# Patient Record
Sex: FEMALE | Race: WHITE | NOT HISPANIC OR LATINO | Employment: FULL TIME | URBAN - METROPOLITAN AREA
[De-identification: names, ages, dates, MRNs, and addresses within clinical notes are randomized per-mention and may not be internally consistent; named-entity substitution may affect disease eponyms.]

---

## 2018-01-15 ENCOUNTER — IMMUNIZATION (OUTPATIENT)
Dept: INTERNAL MEDICINE | Facility: CLINIC | Age: 38
End: 2018-01-15
Payer: COMMERCIAL

## 2018-01-15 PROCEDURE — 90686 IIV4 VACC NO PRSV 0.5 ML IM: CPT | Mod: S$GLB,,, | Performed by: FAMILY MEDICINE

## 2018-01-15 PROCEDURE — 90471 IMMUNIZATION ADMIN: CPT | Mod: S$GLB,,, | Performed by: FAMILY MEDICINE

## 2023-01-16 ENCOUNTER — OFFICE VISIT (OUTPATIENT)
Dept: URGENT CARE | Facility: CLINIC | Age: 43
End: 2023-01-16
Payer: OTHER GOVERNMENT

## 2023-01-16 VITALS
BODY MASS INDEX: 21.09 KG/M2 | OXYGEN SATURATION: 100 % | SYSTOLIC BLOOD PRESSURE: 133 MMHG | HEIGHT: 63 IN | DIASTOLIC BLOOD PRESSURE: 99 MMHG | TEMPERATURE: 98 F | HEART RATE: 92 BPM | WEIGHT: 119 LBS | RESPIRATION RATE: 12 BRPM

## 2023-01-16 DIAGNOSIS — R35.0 FREQUENCY OF URINATION: ICD-10-CM

## 2023-01-16 DIAGNOSIS — N30.01 ACUTE CYSTITIS WITH HEMATURIA: Primary | ICD-10-CM

## 2023-01-16 LAB
BILIRUB UR QL STRIP: POSITIVE
GLUCOSE UR QL STRIP: NEGATIVE
KETONES UR QL STRIP: NEGATIVE
LEUKOCYTE ESTERASE UR QL STRIP: NEGATIVE
PH, POC UA: 7.5
POC BLOOD, URINE: POSITIVE
POC NITRATES, URINE: POSITIVE
PROT UR QL STRIP: POSITIVE
SP GR UR STRIP: 1.01 (ref 1–1.03)
UROBILINOGEN UR STRIP-ACNC: ABNORMAL (ref 0.1–1.1)

## 2023-01-16 PROCEDURE — 99214 PR OFFICE/OUTPT VISIT, EST, LEVL IV, 30-39 MIN: ICD-10-PCS | Mod: S$GLB,,, | Performed by: NURSE PRACTITIONER

## 2023-01-16 PROCEDURE — 81003 URINALYSIS AUTO W/O SCOPE: CPT | Mod: QW,S$GLB,, | Performed by: NURSE PRACTITIONER

## 2023-01-16 PROCEDURE — 81003 POCT URINALYSIS, DIPSTICK, AUTOMATED, W/O SCOPE: ICD-10-PCS | Mod: QW,S$GLB,, | Performed by: NURSE PRACTITIONER

## 2023-01-16 PROCEDURE — 99214 OFFICE O/P EST MOD 30 MIN: CPT | Mod: S$GLB,,, | Performed by: NURSE PRACTITIONER

## 2023-01-16 RX ORDER — NITROFURANTOIN 25; 75 MG/1; MG/1
100 CAPSULE ORAL 2 TIMES DAILY
Qty: 10 CAPSULE | Refills: 0 | Status: SHIPPED | OUTPATIENT
Start: 2023-01-16 | End: 2023-01-21

## 2023-01-16 NOTE — PROGRESS NOTES
"Subjective:       Patient ID: Noreen Martniez is a 42 y.o. female.    Vitals:  height is 5' 3" (1.6 m) and weight is 54 kg (119 lb). Her temperature is 98.2 °F (36.8 °C). Her blood pressure is 133/99 (abnormal) and her pulse is 92. Her respiration is 12 and oxygen saturation is 100%.     Chief Complaint: Dysuria        Pateint is a 42 year old female who presents to urgent care for evaluation of dysuria. Denies associated symptoms of abdominal pain, back pain, flank pain, fever or chills.     Dysuria   This is a new problem. The current episode started yesterday. The problem occurs every urination. The problem has been gradually worsening. The quality of the pain is described as burning. The pain is at a severity of 4/10. The pain is mild. There has been no fever. She is Sexually active. There is No history of pyelonephritis. Associated symptoms include frequency, hematuria, hesitancy, urgency and withholding. Pertinent negatives include no chills, discharge, flank pain, nausea, possible pregnancy, vomiting, bubble bath use or constipation. Treatments tried: AZO. The treatment provided mild relief. There is no history of diabetes insipidus, diabetes mellitus, hypertension, kidney stones, recurrent UTIs, a single kidney or STD.     Constitution: Negative. Negative for chills and fever.   HENT: Negative.     Neck: neck negative.   Cardiovascular: Negative.    Eyes: Negative.    Respiratory: Negative.     Gastrointestinal: Negative.  Negative for nausea, vomiting and constipation.   Genitourinary:  Positive for dysuria, frequency, urgency and hematuria. Negative for flank pain.   Musculoskeletal: Negative.    Skin: Negative.      Objective:      Physical Exam   Constitutional: She is oriented to person, place, and time. She appears well-developed. She is cooperative.  Non-toxic appearance. She does not appear ill. No distress.   HENT:   Head: Normocephalic and atraumatic.   Ears:   Right Ear: Hearing, tympanic " membrane, external ear and ear canal normal.   Left Ear: Hearing, tympanic membrane, external ear and ear canal normal.   Nose: Nose normal. No mucosal edema, rhinorrhea or nasal deformity. No epistaxis. Right sinus exhibits no maxillary sinus tenderness and no frontal sinus tenderness. Left sinus exhibits no maxillary sinus tenderness and no frontal sinus tenderness.   Mouth/Throat: Uvula is midline, oropharynx is clear and moist and mucous membranes are normal. No trismus in the jaw. Normal dentition. No uvula swelling. No posterior oropharyngeal erythema.   Eyes: Conjunctivae and lids are normal. Right eye exhibits no discharge. Left eye exhibits no discharge. No scleral icterus.   Neck: Trachea normal and phonation normal. Neck supple.   Cardiovascular: Normal rate, regular rhythm, normal heart sounds and normal pulses.   Pulmonary/Chest: Effort normal and breath sounds normal. No respiratory distress.   Abdominal: Normal appearance and bowel sounds are normal. She exhibits no distension and no mass. Soft. There is no abdominal tenderness. There is no left CVA tenderness and no right CVA tenderness.   Musculoskeletal: Normal range of motion.         General: No deformity. Normal range of motion.   Neurological: She is alert and oriented to person, place, and time. She exhibits normal muscle tone. Coordination normal.   Skin: Skin is warm, dry, intact, not diaphoretic and not pale.   Psychiatric: Her speech is normal and behavior is normal. Judgment and thought content normal.   Nursing note and vitals reviewed.      Assessment:       1. Acute cystitis with hematuria    2. Frequency of urination            Plan:         Acute cystitis with hematuria  -     nitrofurantoin, macrocrystal-monohydrate, (MACROBID) 100 MG capsule; Take 1 capsule (100 mg total) by mouth 2 (two) times daily. for 5 days  Dispense: 10 capsule; Refill: 0    Frequency of urination  -     POCT Urinalysis, Dipstick, Automated, W/O Scope               Results for orders placed or performed in visit on 01/16/23   POCT Urinalysis, Dipstick, Automated, W/O Scope   Result Value Ref Range    POC Blood, Urine Positive (A) Negative    POC Bilirubin, Urine Positive (A) Negative    POC Urobilinogen, Urine Pos 0.1 - 1.1    POC Ketones, Urine Negative Negative    POC Protein, Urine Positive (A) Negative    POC Nitrates, Urine Positive (A) Negative    POC Glucose, Urine Negative Negative    pH, UA 7.5     POC Specific Gravity, Urine 1.010 1.003 - 1.029    POC Leukocytes, Urine Negative Negative             PLEASE READ YOUR DISCHARGE INSTRUCTIONS ENTIRELY AS IT CONTAINS IMPORTANT INFORMATION.      Please drink plenty of fluids.    Please get plenty of rest.    Please return here or go to the Emergency Department for any concerns or worsening of condition.    Please take an over the counter antihistamine medication (allegra/Claritin/Zyrtec) of your choice as directed.    Try an over the counter decongestant like Mucinex D or Sudafed. You buy this behind the pharmacy counter    If you do have Hypertension or palpitations, it is safe to take Coricidin HBP for relief of sinus symptoms.    If not allergic, please take over the counter Tylenol (Acetaminophen) and/or Motrin (Ibuprofen) as directed for control of pain and/or fever.  Please follow up with your primary care doctor or specialist as needed.    Sore throat recommendations: Warm fluids, warm salt water gargles, throat lozenges, tea, honey, soup, rest, hydration.    Use over the counter flonase: one spray each nostril twice daily OR two sprays each nostril once daily.     Sinus rinses DO NOT USE TAP WATER, if you must, water must be a rolling boil for 1 minute, let it cool, then use.  May use distilled water, or over the counter nasal saline rinses.  Vics vapor rub in shower to help open nasal passages.  May use nasal gel to keep passages moisturized.  May use Nasal saline sprays during the day for added relief of  congestion.   For those who go to the gym, please do not use the sauna or steam room now to clear sinuses.    If you  smoke, please stop smoking.      Please return or see your primary care doctor if you develop new or worsening symptoms.     Please arrange follow up with your primary medical clinic as soon as possible. You must understand that you've received an Urgent Care treatment only and that you may be released before all of your medical problems are known or treated. You, the patient, will arrange for follow up as instructed. If your symptoms worsen or fail to improve you should go to the Emergency Room.

## 2023-04-26 ENCOUNTER — PATIENT MESSAGE (OUTPATIENT)
Dept: ADMINISTRATIVE | Facility: OTHER | Age: 43
End: 2023-04-26
Payer: OTHER GOVERNMENT

## 2023-08-05 ENCOUNTER — OFFICE VISIT (OUTPATIENT)
Dept: URGENT CARE | Facility: CLINIC | Age: 43
End: 2023-08-05
Payer: OTHER GOVERNMENT

## 2023-08-05 VITALS
OXYGEN SATURATION: 98 % | DIASTOLIC BLOOD PRESSURE: 97 MMHG | RESPIRATION RATE: 18 BRPM | WEIGHT: 115 LBS | BODY MASS INDEX: 20.38 KG/M2 | SYSTOLIC BLOOD PRESSURE: 137 MMHG | HEIGHT: 63 IN | TEMPERATURE: 99 F | HEART RATE: 109 BPM

## 2023-08-05 DIAGNOSIS — N89.8 VAGINAL DISCHARGE: ICD-10-CM

## 2023-08-05 DIAGNOSIS — R30.0 DYSURIA: ICD-10-CM

## 2023-08-05 DIAGNOSIS — N30.01 ACUTE CYSTITIS WITH HEMATURIA: Primary | ICD-10-CM

## 2023-08-05 LAB
BILIRUB UR QL STRIP: NEGATIVE
GLUCOSE UR QL STRIP: NEGATIVE
KETONES UR QL STRIP: POSITIVE
LEUKOCYTE ESTERASE UR QL STRIP: POSITIVE
PH, POC UA: 5
POC BLOOD, URINE: POSITIVE
POC NITRATES, URINE: NEGATIVE
PROT UR QL STRIP: POSITIVE
SP GR UR STRIP: 1.02 (ref 1–1.03)
UROBILINOGEN UR STRIP-ACNC: NORMAL (ref 0.1–1.1)

## 2023-08-05 PROCEDURE — 81003 URINALYSIS AUTO W/O SCOPE: CPT | Mod: QW,S$GLB,,

## 2023-08-05 PROCEDURE — 99213 OFFICE O/P EST LOW 20 MIN: CPT | Mod: S$GLB,,,

## 2023-08-05 PROCEDURE — 99213 PR OFFICE/OUTPT VISIT, EST, LEVL III, 20-29 MIN: ICD-10-PCS | Mod: S$GLB,,,

## 2023-08-05 PROCEDURE — 81514 NFCT DS BV&VAGINITIS DNA ALG: CPT

## 2023-08-05 PROCEDURE — 81003 POCT URINALYSIS, DIPSTICK, AUTOMATED, W/O SCOPE: ICD-10-PCS | Mod: QW,S$GLB,,

## 2023-08-05 RX ORDER — NITROFURANTOIN 25; 75 MG/1; MG/1
100 CAPSULE ORAL 2 TIMES DAILY
Qty: 10 CAPSULE | Refills: 0 | Status: SHIPPED | OUTPATIENT
Start: 2023-08-05 | End: 2023-08-10

## 2023-08-05 NOTE — PROGRESS NOTES
"Subjective:      Patient ID: Noreen Martinez is a 43 y.o. female.    Vitals:  height is 5' 3" (1.6 m) and weight is 52.2 kg (115 lb). Her tympanic temperature is 98.7 °F (37.1 °C). Her blood pressure is 137/97 (abnormal) and her pulse is 109. Her respiration is 18 and oxygen saturation is 98%.     Chief Complaint: Dysuria    Noreen Martinez is a 43 y.o. female who presents for dysuria which onset this morning. Associated sxs include increased urinary frequency and urgency and pelvic pain. She also reports vaginal discharge. Patient denies any fever, chills, abd pain, flank pain, hematuria, and decreased urination. Patient does have a hx of UTIs. No hx of pyelonephritis. Prior Tx includes AZO.     Dysuria   This is a new problem. The current episode started today. The problem has been gradually worsening. The quality of the pain is described as burning and aching. The pain is at a severity of 5/10. There is No history of pyelonephritis. Associated symptoms include hesitancy and urgency. Pertinent negatives include no behavior changes, chills, discharge, flank pain, frequency, hematuria, nausea, possible pregnancy, sweats, vomiting, weight loss, bubble bath use, constipation, rash or withholding. Treatments tried: AZO. The treatment provided mild relief.       Constitution: Negative for chills, fatigue and fever.   Gastrointestinal:  Positive for abdominal pain. Negative for nausea, vomiting, constipation and diarrhea.   Genitourinary:  Positive for dysuria, urgency and vaginal discharge. Negative for frequency, urine decreased, flank pain, bladder incontinence, hematuria, history of kidney stones and genital sore.   Skin:  Negative for rash.   Neurological:  Negative for numbness and tingling.      Objective:     Physical Exam   Constitutional: She is oriented to person, place, and time. She appears well-developed.   HENT:   Head: Normocephalic and atraumatic.   Ears:   Right Ear: External ear normal.   Left Ear: " External ear normal.   Nose: Nose normal.   Mouth/Throat: Mucous membranes are normal.   Eyes: Conjunctivae and lids are normal.   Neck: Trachea normal. Neck supple.   Cardiovascular: Normal rate, regular rhythm and normal heart sounds.   Pulmonary/Chest: Effort normal and breath sounds normal. No respiratory distress.   Abdominal: Normal appearance and bowel sounds are normal. She exhibits no distension and no mass. Soft. There is no abdominal tenderness. There is no rebound, no guarding, no left CVA tenderness and no right CVA tenderness.   Musculoskeletal: Normal range of motion.         General: Normal range of motion.   Neurological: She is alert and oriented to person, place, and time. She has normal strength.   Skin: Skin is warm, dry, intact, not diaphoretic and not pale.   Psychiatric: Her speech is normal and behavior is normal. Judgment and thought content normal.   Nursing note and vitals reviewed.      Assessment:     1. Acute cystitis with hematuria    2. Dysuria    3. Vaginal discharge        Results for orders placed or performed in visit on 08/05/23   POCT Urinalysis, Dipstick, Automated, W/O Scope   Result Value Ref Range    POC Blood, Urine Positive (A) Negative    POC Bilirubin, Urine Negative Negative    POC Urobilinogen, Urine Normal 0.1 - 1.1    POC Ketones, Urine Positive (A) Negative    POC Protein, Urine Positive (A) Negative    POC Nitrates, Urine Negative Negative    POC Glucose, Urine Negative Negative    pH, UA 5.0     POC Specific Gravity, Urine 1.025 1.003 - 1.029    POC Leukocytes, Urine Positive (A) Negative       Plan:       Acute cystitis with hematuria  -     nitrofurantoin, macrocrystal-monohydrate, (MACROBID) 100 MG capsule; Take 1 capsule (100 mg total) by mouth 2 (two) times daily. for 5 days  Dispense: 10 capsule; Refill: 0    Dysuria  -     POCT Urinalysis, Dipstick, Automated, W/O Scope    Vaginal discharge  -     VAGINOSIS SCREEN BY DNA PROBE    Afebrile.  UA with  hematuria and pyuria. Also, protein and ketones. She is taking AZO.  Reviewed abnormal urinalysis with patient who verbalized understanding.  Vaginosis screen ordered due to vaginal discharge. Will notify patient with results in 3-5 days and treat as necessary.  Meds: macrobid sent to preferred pharmacy.  Increase fluid intake.  Discussed proper hygiene practices such as wiping front to back, take showers not baths, no scented soaps, wear breathable cotton underwear, and urinate after sexual intercourse.  RTC for any worsening symptoms.  Discussed ER precautions including fever, worsening flank pain, vomiting, etc.

## 2023-08-05 NOTE — PATIENT INSTRUCTIONS
PLEASE READ YOUR DISCHARGE INSTRUCTIONS ENTIRELY AS IT CONTAINS IMPORTANT INFORMATION.      Take the antibiotics to completion.     Drink plenty of fluids, wipe front to back, take showers not baths, no scented soaps, wear breathable cotton underwear, urinate after sexual intercourse.     Take the pyridium three times a day with meals. It will turn your urine orange. You do not need to take the whole prescription you can stop this once the pain is better and finish out the antibiotics    Please go to the ER for worsening symptoms including fever, worsening flank pain, vomiting, etc.       Please return or see your primary care doctor if you develop new or worsening symptoms.     Please arrange follow up with your primary medical clinic as soon as possible. You must understand that you've received an Urgent Care treatment only and that you may be released before all of your medical problems are known or treated. You, the patient, will arrange for follow up as instructed. If your symptoms worsen or fail to improve you should go to the Emergency Room.  WE CANNOT RULE OUT ALL POSSIBLE CAUSES OF YOUR SYMPTOMS IN THE URGENT CARE SETTING PLEASE GO TO THE ER IF YOU FEELS YOUR CONDITION IS WORSENING OR YOU WOULD LIKE EMERGENT EVALUATION.

## 2023-08-07 ENCOUNTER — TELEPHONE (OUTPATIENT)
Dept: URGENT CARE | Facility: CLINIC | Age: 43
End: 2023-08-07
Payer: OTHER GOVERNMENT

## 2023-08-07 LAB
BACTERIAL VAGINOSIS DNA: NEGATIVE
CANDIDA GLABRATA DNA: NEGATIVE
CANDIDA KRUSEI DNA: NEGATIVE
CANDIDA RRNA VAG QL PROBE: NEGATIVE
T VAGINALIS RRNA GENITAL QL PROBE: NEGATIVE

## 2023-08-08 NOTE — TELEPHONE ENCOUNTER
Called to discuss negative vaginosis screening and no answer. Patient noted to have viewed results on [employer]. If pt has any additional questions may reach out to clinic.

## 2024-01-03 ENCOUNTER — OFFICE VISIT (OUTPATIENT)
Dept: URGENT CARE | Facility: CLINIC | Age: 44
End: 2024-01-03
Payer: OTHER GOVERNMENT

## 2024-01-03 VITALS
WEIGHT: 114 LBS | OXYGEN SATURATION: 100 % | HEIGHT: 63 IN | TEMPERATURE: 99 F | BODY MASS INDEX: 20.2 KG/M2 | DIASTOLIC BLOOD PRESSURE: 96 MMHG | RESPIRATION RATE: 18 BRPM | SYSTOLIC BLOOD PRESSURE: 144 MMHG | HEART RATE: 97 BPM

## 2024-01-03 DIAGNOSIS — B30.9 VIRAL CONJUNCTIVITIS: Primary | ICD-10-CM

## 2024-01-03 PROCEDURE — 99213 OFFICE O/P EST LOW 20 MIN: CPT | Mod: S$GLB,,, | Performed by: PHYSICIAN ASSISTANT

## 2024-01-03 NOTE — PATIENT INSTRUCTIONS
Over the counter PATADAY eye drops as directed. OTC Systane or OPTIVE brand lubricating drops throughout the day as needed - avoid using within 45 minutes of the PATADAY. Refrigerate the drops to help with irritation. Warm compresses as needed especially in the morning to help with crusting.    Viral conjunctivitis is still very contagious. Wash hands often.    You do not need antibiotic drops unless you develop signs of bacterial pinkeye - thick green/yellow drainage that is continuous throughout the day, worsening redness.    You need re-evaluation with ophthalmology if vision changes or worsening symptoms.     Do not wear your contact lens ( if you use them) for at least 5 days after you stop having symptoms and are rechecked by your doctor. Throw away the contacts, contact solution and carrying case you were using and start with new material. You may also need to throw away any eye makeup/mascara that you used while your eye was irritated.  If you develop increase eye symptoms or change in your vision seek medical care immediately either with your ophthalomologist or the ER or return here.

## 2024-01-03 NOTE — PROGRESS NOTES
"Subjective:      Patient ID: Noreen Martinez is a 43 y.o. female.    Vitals:  height is 5' 3" (1.6 m) and weight is 51.7 kg (114 lb). Her oral temperature is 98.5 °F (36.9 °C). Her blood pressure is 144/96 (abnormal) and her pulse is 97. Her respiration is 18 and oxygen saturation is 100%.     Chief Complaint: Eye Problem    Bilateral eye irritation onset yesterday with crusting to both eyes this morning. Has clear drainage since then. No redness, vision changes, trauma, HA, N/V or hx of eye dz. Does not wear contacts. Son had pink eye last week     Eye Problem   Both eyes are affected. This is a new problem. The current episode started yesterday. The problem occurs constantly. The problem has been unchanged. There was no injury mechanism. The pain is at a severity of 4/10. The pain is mild. There is Known exposure to pink eye. She Does not wear contacts. Associated symptoms include an eye discharge and itching. Pertinent negatives include no blurred vision, double vision, eye redness, fever, foreign body sensation, nausea, photophobia, recent URI or vomiting. She has tried eye drops for the symptoms. The treatment provided mild relief.       Constitution: Negative for sweating, fatigue, fever and unexpected weight change.   Eyes:  Positive for eye discharge, eye itching and eye pain (irritation). Negative for eye trauma, foreign body in eye, eye redness, photophobia, vision loss, double vision, blurred vision and eyelid swelling.   Respiratory:  Negative for cough, sputum production and shortness of breath.    Gastrointestinal:  Negative for nausea and vomiting.   Neurological:  Negative for headaches, numbness and tingling.      Objective:     Physical Exam   Constitutional: She is oriented to person, place, and time. She appears well-developed. She is cooperative.  Non-toxic appearance. She does not appear ill. No distress.   HENT:   Head: Normocephalic and atraumatic.   Ears:   Right Ear: Hearing, tympanic " membrane, external ear and ear canal normal.   Left Ear: Hearing, tympanic membrane, external ear and ear canal normal.   Nose: Nose normal. No mucosal edema, rhinorrhea or nasal deformity. No epistaxis. Right sinus exhibits no maxillary sinus tenderness and no frontal sinus tenderness. Left sinus exhibits no maxillary sinus tenderness and no frontal sinus tenderness.   Mouth/Throat: Uvula is midline, oropharynx is clear and moist and mucous membranes are normal. No trismus in the jaw. Normal dentition. No uvula swelling. No oropharyngeal exudate, posterior oropharyngeal edema or posterior oropharyngeal erythema.   Eyes: Conjunctivae and lids are normal. Lids are everted and swept, no foreign bodies found. Right eye exhibits no chemosis, no discharge and no exudate. No foreign body present in the right eye. Left eye exhibits no chemosis, no discharge and no exudate. No foreign body present in the left eye. Right conjunctiva is not injected. Right conjunctiva has no hemorrhage. Left conjunctiva is not injected. Left conjunctiva has no hemorrhage. No scleral icterus. Extraocular movement intact vision grossly intact gaze aligned appropriately      Comments: Bilateral sclera clear. No exudate. No chemosis.     EOMI PERRLA. Viewed with flourescein and woods lamp - no abrasion, laceration or foreign body    Neck: Trachea normal and phonation normal. Neck supple. No edema present. No erythema present. No neck rigidity present.   Cardiovascular: Normal rate, regular rhythm, normal heart sounds and normal pulses.   Pulmonary/Chest: Effort normal and breath sounds normal. No respiratory distress. She has no decreased breath sounds. She has no rhonchi.   Abdominal: Normal appearance.   Musculoskeletal: Normal range of motion.         General: No deformity. Normal range of motion.   Neurological: She is alert and oriented to person, place, and time. She exhibits normal muscle tone. Coordination normal.   Skin: Skin is warm,  dry, intact, not diaphoretic and not pale.   Psychiatric: Her speech is normal and behavior is normal. Judgment and thought content normal.   Nursing note and vitals reviewed.      Assessment:     1. Viral conjunctivitis        Plan:     History and exam highly consistent with viral conjunctivitis. Do not recommend abx drops at this time. Given recommendations for supportive care measures and good hand hygiene.     She can contact the clinic if worsening copious exudate throughout the day or not noticing improving symptoms in 3-4 days - may warrant abx drops. Otherwise continue current management.    Viral conjunctivitis  -     fluorescein-benoxinate 0.25-0.4% ophthalmic solution 1 drop    Brittney Fernandez PA-C  [facility]       Patient Instructions   Over the counter PATADAY eye drops as directed. OTC Systane or OPTIVE brand lubricating drops throughout the day as needed - avoid using within 45 minutes of the PATADAY. Refrigerate the drops to help with irritation. Warm compresses as needed especially in the morning to help with crusting.    Viral conjunctivitis is still very contagious. Wash hands often.    You do not need antibiotic drops unless you develop signs of bacterial pinkeye - thick green/yellow drainage that is continuous throughout the day, worsening redness.    You need re-evaluation with ophthalmology if vision changes or worsening symptoms.     Do not wear your contact lens ( if you use them) for at least 5 days after you stop having symptoms and are rechecked by your doctor. Throw away the contacts, contact solution and carrying case you were using and start with new material. You may also need to throw away any eye makeup/mascara that you used while your eye was irritated.  If you develop increase eye symptoms or change in your vision seek medical care immediately either with your ophthalomologist or the ER or return here.